# Patient Record
Sex: FEMALE | Race: WHITE | Employment: UNEMPLOYED | URBAN - METROPOLITAN AREA
[De-identification: names, ages, dates, MRNs, and addresses within clinical notes are randomized per-mention and may not be internally consistent; named-entity substitution may affect disease eponyms.]

---

## 2017-05-08 ENCOUNTER — TELEPHONE (OUTPATIENT)
Dept: NUTRITION | Age: 22
End: 2017-05-08

## 2017-05-08 NOTE — TELEPHONE ENCOUNTER
Nutrition Counseling: Called pt regarding Dr. Bryan Welch Jr.'s referral for nutrition counseling. Pt discussed health concerns and is interested in setting appt after insurance check is complete. RD will email pt per request when insurance check complete and set appt.     Adalberto Rashid, [address] [place] [address],   Outpatient Dietitian  Office: [phone]  Cell: [phone]

## 2017-05-11 ENCOUNTER — DOCUMENTATION ONLY (OUTPATIENT)
Dept: NUTRITION | Age: 22
End: 2017-05-11

## 2017-05-11 NOTE — PROGRESS NOTES
Nutrition Counseling: Emailed pt with insurance coverage information, and noted that pt can call or email to set appt.      Amber Choi, [address],   Outpatient Dietitian  Office: [phone]  Cell: [phone]

## 2017-05-15 ENCOUNTER — DOCUMENTATION ONLY (OUTPATIENT)
Dept: NUTRITION | Age: 22
End: 2017-05-15

## 2017-05-15 NOTE — PROGRESS NOTES
Nutrition Counseling:  RD emailed pt per her request with insurance coverage update. Pt emailed RD back to confirm that she would like to schedule. Appt set for 5/18/17 @ 10:00 am. Nutrition assessment forms and appointment information sent.       Kristina Gastelum, [address] [place] [address],   W: [phone]  C: [phone]

## 2017-05-18 ENCOUNTER — HOSPITAL ENCOUNTER (OUTPATIENT)
Dept: NUTRITION | Age: 22
Discharge: HOME | End: 2017-05-18
Attending: FAMILY MEDICINE
Payer: COMMERCIAL

## 2017-05-18 PROCEDURE — 97802 MEDICAL NUTRITION INDIV IN: CPT

## 2017-05-18 NOTE — PROGRESS NOTES
NUTRITION ASSESSMENT:    Food/Nutrition History:  Based on reported usual intake, pt eats 3 meals/day with 3 snacks ranging between 1.5 hrs to 3 hrs apart. Diet appears inadequate in calcium-containing choices, slightly inadequate in servings of fruits and vegetables, inadequate in fiber, likely adequate in protein (except for some snack choices and lunch meal), and low in added sugar. Pt has not had allergy testing, but follows gluten-free and lactose-free diet citing that gluten makes her \"lethargic\" and dairy causes bloating. She also avoids coconut and coffee. Pt drinks 60 oz water/day, avoids caffeine, and drinks Kombucha tea occasionally. Dining out habits include [employer] or [employer] twice/month. Pt has an exercise regime of doing yoga 4 times/week. She was previously doing aerobics [facility] 4-5 times/week but stopped d/t worsening hypoglycemic symptoms. Pt reports sleep habits are adequate at 8-9 hrs/night. Pt reports high stress level d/t anxiety and reports hx of panic disorder. Pt appears able to obtain appropriate nutritional choices as desired, and her grandmother prepares some dinner meals during week. Client History:    Current Medical Diagnosis: Hypoglycemia    History of current illness: Pt is referred by Dr. Beatris Calderon. for nutrition counseling with 1 visit requested. Pt is covered for 10 visits with insurance.     Past Medical History: IBS, anxiety/depression, GERD, dysmenorrhea, fatigue, fibromyalgia, migraines; has noticed loose stools in past 2 weeks, typically has more constipation    Family History: RA    Nutritionally Relevant Medications (prescribed and over-the-counter): Loestrin     Supplements/Vitamins/Herbs: B complex vitamin, has recently started taking wtihin past 2 weeks: Ryan's Wort (reviewed potential side effects: anxiety, dizziness, GI upset, fatigue), Drenatrophin (reviewed potential side effects of adrenal supplement: anxiety, increased cortisol and epinephrine production, mood alteration, active ingredient of magnesium citrate may have laxative effect)    Relevant Labs: Only recorded BS: 77 mg/dL (3/23/17), pt doesn't check BS at home    Nutrition Focused Physical Findings: None    Socioeconomic status/lifestyle/family influence: 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 Social Support: Grandmother (helps with preparing meals at home)    Motivation/Stage of Change: Action (pt is attempting to make some changes but is unsure of additional changes to make)    Anthropometric Data:  Ht: 5'1.75\" (156.9 cm)  Wt: 117# (53.1 kg)  BMI: 21.6 (normal range)  IBW: 109# (49.5 kg)  Any recent rapid weight loss or gain: Pt notes losing 8-10# since last year d/t exercising. She says she is content at current wt. Estimated Nutritional Needs to Maintain Current Body Wt using [employer] equation, activity factor of 1.3:   1626 kcal/day   Protein: 53-69 g/day (1-1.3 g/kg IBW)  Carbohydrates: ~210 g/day (50% EEN), or 45 gm/meal (3) and 30 gm/snack (2) + 15 gm/snack (1)  Fluids: 1.6 L/day (1 ml/kcal)      NUTRITION DIAGNOSIS:   Food-and nutrition-related knowledge deficit r/t lack of prior exposure to accurate nutrition-related information concerning hypoglycemia as evidenced by verbalization of incomplete information and voiced desire to learn diet recommendations for condition. SPECIFIC INTERVENTION FOLLOWING CURRENT ENCOUNTER:     Appointment length:75 minutes      Nutrition Intervention: Nutrition Education: Overview of purpose of nutrition  education, nutrition relationship to health and disease, and recommended diet  modifications. Nutrition Counseling: Problem solving, goal setting. Encounter Notes:   Nutrition Counseling:  · Discussed pts intake and activity patterns as above.  Discussed pt's health goals, present health risks, and perceived benefits to making changes. Pt notes motivations of wanting to improve overall health and minimize hypoglycemia symptoms such as tremors. · Reviewed any past attempts to make diet-related changes. Pt reports following [employer] fitness program which provides pre-portioned containers in addition to protein shakes and workout routine videos. She uses protein shakes for breakfast and some of the containers for food groups (noted that CHO serving container was inadequate for CHO servings/meal for pt's individualized needs). · Identified pt's strengths and potential barriers to making changes. Identified strength of knowledge of healthful food choices and some knowledge of reducing hypoglycemia episodes (ex: include protein with meals, don't wait long time between meals to eat). She reports barrier of knowledge deficit concerning in-depth diet guidelines for hypoglycemia and sometimes lacking time/energy to cook own meals, though she notes that grandmother helps. Pt focused on barrier of nutrition education for appt today which was addressed in education noted below. · Discussed accountability and self-efficacy strategies. RD suggested getting glucometer to track trends in BS levels and symptoms, and help discern if she is really have low BS reading or just symptoms of low BS. Pt was agreeable and notes fiance is another support. · Gave goal setting guide and collaborated with pt to determine goals. Pt was attentive and asked appropriate questions. Pt appeared highly motivated to make changes. Expect that good attempts will be made to follow guidelines. Nutrition Education:   · Explained relationship of nutrition and lifestyle factors on health goals. Made recommendations for consuming adequate protein, fiber, and fats and reviewed impact of these food sources slowing digestion and moderating rise and fall in BS levels.  Noted impact of refined CHOs on insulin and BS levels, and identified refined CHO sources in diet recall such as pretzels and GF bread. Encouaraged consistent amount of CHOs at each meal, and eating a CHO snack before working out if she plans to return to higher intensity exercises. · Discussed impact of stress/anxiety on exacerbating hypoglycemic symptoms, and pt notes she will be seeing a counselor to help with anxiety management. Addressed pt's question regarding link between diet and mood by explaining ongoing research r/t gut brain axis and foods to promote gut health. Discussed handout r/t the following diet recommendations to reduce inflammation and promote flourishing gut microflora: increasing intake of omega 3 fatty acids, high-fiber foods, anti-oxidant rich fruits and vegetables, pre- and probiotics, and spices and herbs. · Provided patient with plate method handout and explained balancing each meal with complex CHOs and protein in appropriate portions per meal and snack. · Discussed potential side effects of supplements pt is using and noted that some of side effects may mimic hypoglycemia symptoms. Explained that supplements are not regulated by FDA and showed pt Office of Dietary Supplements website to research quality/efficacy of supplements. · Nutrition prescription/meals and snacks: ~8028-5402 kcal/day with 3 meals and 2-3 snacks with emphasis on including protein and fiber at each meal/snack. · All pt's questions were answered, and RD agreed to email pt with further information about Drenatrophin supplement. Pt verbalized adequate understanding of recommendations discussed. · Materials Provided:   1. Postprandial Reactive Hypoglycemia  2. Nutrition Management of Low Blood Sugar without Diabetes ([facility])  3. Athlete's Plate for Weight Management  4. 5 Foods to Fight Inflammation  5. SMART Goals sheet     Goals:   Goal: Consume adequate intake to meet estimated nutritional needs while promoting normalized blood sugar levels (of greater than 70 mg/dL) and reducing hypoglycemic symptoms.     Plan: Pt agreed to the following goals: 1) Include recommended protein intake at meals and snacks. 2) Start tracking blood sugar trends. 3) Have a CHO-containing snack before workouts if doing higher intensity activities. MONITORING/EVALUATION DETAILS:    Follow-up Appointment: RD will email pt in 2 weeks to f/u on progress and set f/u appt if desired. RD will also email pt with additional information regarding supplements and potential interactions/side effects. Follow-up Monitoring Plans: If pt returns for f/u appt, RD will monitor any changes in BS trends. Will address any successes/challenges to goals set in initial appt. Will review diet recall to compare with recommendations.     Diann Lo, [address] [place] [address],   W: [phone]  C: [phone]

## 2017-10-18 PROBLEM — H74.09 TYMPANOSCLEROSIS INVOLVING TYMPANIC MEMBRANE ONLY: Status: ACTIVE | Noted: 2017-08-16

## 2017-10-18 PROBLEM — J34.2 DEVIATED NASAL SEPTUM: Status: ACTIVE | Noted: 2017-08-16

## 2017-10-18 PROBLEM — H93.A2 PULSATILE TINNITUS, LEFT EAR: Status: ACTIVE | Noted: 2017-08-16

## 2017-10-18 PROBLEM — J32.0 SINUSITIS, MAXILLARY, CHRONIC: Status: ACTIVE | Noted: 2017-08-16

## 2017-10-18 PROBLEM — H90.8 HEARING LOSS, MIXED CONDUCTIVE AND SENSORINEURAL: Status: ACTIVE | Noted: 2017-08-16

## 2017-12-08 ENCOUNTER — DOCUMENTATION ONLY (OUTPATIENT)
Dept: NUTRITION | Age: 22
End: 2017-12-08

## 2017-12-08 NOTE — PROGRESS NOTES
Nutrition Counseling:  Pt has not contacted RD further with questions or to schedule f/u appt since last f/u contact was made. Will close referral for this office.     Humberto Borrego, RD, LD  Outpatient Dietitian  Office: [phone]  Cell: [phone]

## 2022-03-18 PROBLEM — H74.09 TYMPANOSCLEROSIS INVOLVING TYMPANIC MEMBRANE ONLY: Status: ACTIVE | Noted: 2017-08-16

## 2022-03-18 PROBLEM — H93.A2 PULSATILE TINNITUS, LEFT EAR: Status: ACTIVE | Noted: 2017-08-16

## 2022-03-19 PROBLEM — J32.0 SINUSITIS, MAXILLARY, CHRONIC: Status: ACTIVE | Noted: 2017-08-16

## 2022-03-19 PROBLEM — H90.8 HEARING LOSS, MIXED CONDUCTIVE AND SENSORINEURAL: Status: ACTIVE | Noted: 2017-08-16

## 2022-03-20 PROBLEM — J34.2 DEVIATED NASAL SEPTUM: Status: ACTIVE | Noted: 2017-08-16

## 2022-05-18 PROBLEM — G44.209 MIXED COMMON MIGRAINE AND MUSCLE CONTRACTION HEADACHE: Status: ACTIVE | Noted: 2022-05-18

## 2022-05-18 PROBLEM — G93.5 CHIARI I MALFORMATION (HCC): Status: ACTIVE | Noted: 2022-05-18

## 2022-05-18 PROBLEM — R42 VERTIGO: Status: ACTIVE | Noted: 2022-05-18

## 2022-05-18 PROBLEM — G43.009 MIXED COMMON MIGRAINE AND MUSCLE CONTRACTION HEADACHE: Status: ACTIVE | Noted: 2022-05-18

## 2024-12-19 ENCOUNTER — TRANSCRIBE ORDERS (OUTPATIENT)
Dept: SCHEDULING | Age: 29
End: 2024-12-19

## 2024-12-19 DIAGNOSIS — R22.1 LOCALIZED SWELLING, MASS AND LUMP, NECK: Primary | ICD-10-CM
